# Patient Record
Sex: FEMALE | Race: WHITE
[De-identification: names, ages, dates, MRNs, and addresses within clinical notes are randomized per-mention and may not be internally consistent; named-entity substitution may affect disease eponyms.]

---

## 2017-05-15 NOTE — ED
Lower Extremity Injury HPI





- General


Chief Complaint: Extremity Injury, Lower


Stated Complaint: hip pain


Time Seen by Provider: 05/15/17 19:02


Source: patient, RN notes reviewed


Mode of arrival: ambulatory





- History of Present Illness


Initial Comments: 





26-year-old female presents emergency Department chief complaint low back pain, 

left hip pain.  She states been present for 2 months seems to come and go.  It 

is exacerbated by movement.  Patient states that the pain sometimes radiates 

down her leg.  Denies any paresthesias denies any bowel bladder incontinence or 

retention.  States that she does do a large amount of exercising and she was 

doing some outdoor activities as we can which seemed exacerbated.  She states 

that massage made it feel worse.  Patient denies any dysuria or hematuria.





- Related Data


 Previous Rx's











 Medication  Instructions  Recorded


 


Cephalexin [Keflex] 500 mg PO Q12HR 7 Days 16


 


Acetaminophen-Codeine 300-30mg 1 tab PO Q4H PRN #20 tablet 05/15/17





[Tylenol #3]  


 


Cyclobenzaprine [Flexeril] 5 mg PO TID PRN #15 tablet 05/15/17











 Allergies











Allergy/AdvReac Type Severity Reaction Status Date / Time


 


No Known Allergies Allergy   Verified 05/15/17 18:32














Review of Systems


ROS Statement: 


Those systems with pertinent positive or pertinent negative responses have been 

documented in the HPI.





ROS Other: All systems not noted in ROS Statement are negative.





Past Medical History


Past Medical History: No Reported History


History of Any Multi-Drug Resistant Organisms: None Reported


Past Surgical History:  Section


Past Psychological History: No Psychological Hx Reported


Smoking Status: Current every day smoker


Past Alcohol Use History: None Reported


Past Drug Use History: None Reported





General Exam


General appearance: alert, in no apparent distress


Neck exam: Present: normal inspection.  Absent: tenderness, meningismus, 

lymphadenopathy


Respiratory exam: Present: normal lung sounds bilaterally.  Absent: respiratory 

distress, wheezes, rales, rhonchi, stridor


Cardiovascular Exam: Present: regular rate, normal rhythm, normal heart sounds.

  Absent: systolic murmur, diastolic murmur, rubs, gallop, clicks


GI/Abdominal exam: Present: soft, normal bowel sounds.  Absent: distended, 

tenderness, guarding, rebound, rigid


Back exam: Present: full ROM, tenderness (Left low back), paraspinal 

tenderness.  Absent: vertebral tenderness


Neurological exam: Present: alert, oriented X3, CN II-XII intact, reflexes 

normal.  Absent: motor sensory deficit





Course


 Vital Signs











  05/15/17





  18:27


 


Temperature 97.9 F


 


Pulse Rate 80


 


Respiratory 18





Rate 


 


Blood Pressure 120/68


 


O2 Sat by Pulse 97





Oximetry 














Medical Decision Making





- Medical Decision Making





26-year-old female presented emergency department for low back pain.  Patient 

has lumbar radicular pain.  Patient's of pain medication and muscle relaxers.  

He did discuss stretching.  Return parameters were discussed.





Disposition


Clinical Impression: 


 Lumbar pain, Lumbar radicular pain





Disposition: HOME SELF-CARE


Condition: Stable


Instructions:  Lumbar Radiculopathy (ED), Lower Back Exercises (ED)


Additional Instructions: 


Please return to the Emergency Department if symptoms worsen or any other 

concerns.


Prescriptions: 


Acetaminophen-Codeine 300-30mg [Tylenol #3] 1 tab PO Q4H PRN #20 tablet


 PRN Reason: pain


Cyclobenzaprine [Flexeril] 5 mg PO TID PRN #15 tablet


 PRN Reason: Muscle Spasm


Referrals: 


None,Stated [Primary Care Provider] - 1-2 days


Time of Disposition: 19:10

## 2019-02-05 ENCOUNTER — HOSPITAL ENCOUNTER (EMERGENCY)
Dept: HOSPITAL 47 - EC | Age: 29
Discharge: HOME | End: 2019-02-05
Payer: COMMERCIAL

## 2019-02-05 VITALS — SYSTOLIC BLOOD PRESSURE: 125 MMHG | TEMPERATURE: 98.3 F | HEART RATE: 72 BPM | DIASTOLIC BLOOD PRESSURE: 80 MMHG

## 2019-02-05 VITALS — RESPIRATION RATE: 18 BRPM

## 2019-02-05 DIAGNOSIS — Z87.891: ICD-10-CM

## 2019-02-05 DIAGNOSIS — S83.92XA: Primary | ICD-10-CM
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
## 2019-02-05 PROCEDURE — 99284 EMERGENCY DEPT VISIT MOD MDM: CPT

## 2019-02-05 NOTE — XR
Left knee

 

HISTORY: Trauma and pain

 

4 views of the left knee

 

Bone mineralization, joint spaces and alignment are maintained. No fracture or dislocation. No eviden
t joint effusion.

 

IMPRESSION: Normal left knee. Consider knee MRI.

## 2019-02-05 NOTE — ED
General Adult HPI





- General


Chief complaint: Extremity Problem,Nontraumatic


Stated complaint: lt knee injury


Time Seen by Provider: 19 15:56


Source: patient, RN notes reviewed, old records reviewed


Mode of arrival: ambulatory


Limitations: no limitations





- History of Present Illness


Initial comments: 


28-year-old female patient with past history of lower back pain presents to ED 

with 4 days of knee pain.  Patient reports that she has been extremely active 

during this time, including doing tenderness weakly, moving heavy tables.  

Patient denies acute injury.  Patient primarily has pain and quadriceps tendon.

  Patient ambulatory.  Patient denies any other complaints.  Patient denies 

fevers and chills, nausea or diarrhea.





Systemic: Pt denies fatigue, myalgia, fever/chills, rash. Pt denies weakness, 

night sweats, weight loss. 


Neuro: Pt denies headache, visual disturbances, syncope or pre-syncope.


HEENT: Pt denies ocular discharge or irritation, otalgia, rhinorrhea, 

pharyngitis or notable lymphadenopathy. 


Cardiopulmonary: Pt denies chest pain, SOB, heart palpitations, dyspnea on 

exertion.  


Abdominal/GI: Pt denies abdominal pain, n/v/d. 


: Pt denies dysuria, burning w/ urination, frequency/urgency. Denies new 

onset urinary or bowel incontinence.  


Neuro: Pt denies new onset weakness, paresthesias. 








- Related Data


 Previous Rx's











 Medication  Instructions  Recorded


 


Cephalexin [Keflex] 500 mg PO Q12HR 7 Days  cap 16


 


Acetaminophen-Codeine 300-30mg 1 tab PO Q4H PRN #20 tablet 05/15/17





[Tylenol #3]  


 


Cyclobenzaprine [Flexeril] 5 mg PO TID PRN #15 tablet 05/15/17











 Allergies











Allergy/AdvReac Type Severity Reaction Status Date / Time


 


No Known Allergies Allergy   Verified 19 13:32














Review of Systems


ROS Statement: 


Those systems with pertinent positive or pertinent negative responses have been 

documented in the HPI.





ROS Other: All systems not noted in ROS Statement are negative.





Past Medical History


Past Medical History: No Reported History


History of Any Multi-Drug Resistant Organisms: None Reported


Past Surgical History:  Section


Past Psychological History: No Psychological Hx Reported


Smoking Status: Former smoker


Past Alcohol Use History: None Reported


Past Drug Use History: None Reported





General Exam





- General Exam Comments


Initial Comments: 


Constitutional: NAD, AOX3, Pt has pleasant affect. 


HEENT: NC/AT, trachea midline, neck supple, no lymphadenopathy. Posterior 

pharynx non erythematous, without exudates. External ears appear normal, 

without discharge. Mucous membranes moist. Eyes PERRLA, EOM intact. There is no 

scleral icterus. No pallor noted. 


Cardiopulmonary: RRR, no murmurs, rubs or gallops, no JVD noted. Lungs CTAB in 

anterior and posterior fields. No peripheral edema. 


Abdominal exam: Abdomen soft and non-distended. Abdomen non-tender to palpation 

in all 4 quadrants. Bowel sounds active in LLQ. No hepatosplenomegaly. No 

ecchymosis


Neuro: CN II-XII grossly intact. No nuchal rigidity. 


MSK: Patient does have full active range of motion of left knee.  However she 

does have some pain with range of motion.  Patient is ambulatory without 

difficulty.  Patient has some tenderness at the patellar tendon.  No erythema.  

Patient is neurovascularly intact.  Sensation intact, posterior tibialis pulse +

2 bilaterally.  No posterior calf tenderness bilaterally, homans sign negative 

bilaterally. radial pulse +2 bilaterally. Sensation intact in upper and lower 

extremities. Full active ROM in upper and lower extremities, 5/5 stregnth. 





Limitations: no limitations





Course





 Vital Signs











  19





  13:30


 


Temperature 98.1 F


 


Pulse Rate 68


 


Respiratory 18





Rate 


 


Blood Pressure 127/78


 


O2 Sat by Pulse 98





Oximetry 














Medical Decision Making





- Medical Decision Making


28-year-old female patient presents to ED with 4 days of left knee pain.  

Patient vital signs stable, afebrile.  Physical exam revealed some tenderness 

at left patellar tendon.  Full active range of motion, patient laboratory.  

Plain films of left did not display any acute process.  Patient diagnosed with 

knee sprain.  Patient to use Tylenol/Motrin as needed for pain and 

inflammation.  Patient to follow-up with primary care provider and orthopedic 

consult 1-2 days. Case discussed with Dr. Santiago. 








Disposition


Clinical Impression: 


 Left knee sprain





Disposition: HOME SELF-CARE


Condition: Stable


Instructions (If sedation given, give patient instructions):  Knee Sprain (ED)


Additional Instructions: 


Patient to adhere to previously discussed treatment plan and will take 

medication(s) as directed. Patient to follow up with PCP in 1-2 days. Patient 

to return to ED if symptoms do not improve. 


Is patient prescribed a controlled substance at d/c from ED?: No


Referrals: 


None,Stated [Primary Care Provider] - 1-2 days


Ricardo Bravo MD [STAFF PHYSICIAN] - 1-2 days


Time of Disposition: 16:36